# Patient Record
Sex: MALE | URBAN - METROPOLITAN AREA
[De-identification: names, ages, dates, MRNs, and addresses within clinical notes are randomized per-mention and may not be internally consistent; named-entity substitution may affect disease eponyms.]

---

## 2021-11-24 ENCOUNTER — EMERGENCY (EMERGENCY)
Facility: HOSPITAL | Age: 66
LOS: 0 days | Discharge: HOME | End: 2021-11-24
Attending: EMERGENCY MEDICINE
Payer: MEDICARE

## 2021-11-24 VITALS — WEIGHT: 179.9 LBS | HEIGHT: 71 IN

## 2021-11-24 VITALS
DIASTOLIC BLOOD PRESSURE: 93 MMHG | TEMPERATURE: 98 F | OXYGEN SATURATION: 98 % | RESPIRATION RATE: 16 BRPM | SYSTOLIC BLOOD PRESSURE: 153 MMHG | HEART RATE: 66 BPM

## 2021-11-24 DIAGNOSIS — R51.9 HEADACHE, UNSPECIFIED: ICD-10-CM

## 2021-11-24 DIAGNOSIS — M54.9 DORSALGIA, UNSPECIFIED: ICD-10-CM

## 2021-11-24 DIAGNOSIS — E78.5 HYPERLIPIDEMIA, UNSPECIFIED: ICD-10-CM

## 2021-11-24 DIAGNOSIS — R20.2 PARESTHESIA OF SKIN: ICD-10-CM

## 2021-11-24 DIAGNOSIS — M54.50 LOW BACK PAIN, UNSPECIFIED: ICD-10-CM

## 2021-11-24 DIAGNOSIS — I10 ESSENTIAL (PRIMARY) HYPERTENSION: ICD-10-CM

## 2021-11-24 PROCEDURE — 99284 EMERGENCY DEPT VISIT MOD MDM: CPT

## 2021-11-24 PROCEDURE — 70450 CT HEAD/BRAIN W/O DYE: CPT | Mod: MA

## 2021-11-24 PROCEDURE — 70450 CT HEAD/BRAIN W/O DYE: CPT | Mod: 26,MA

## 2021-11-24 PROCEDURE — 99284 EMERGENCY DEPT VISIT MOD MDM: CPT | Mod: 25

## 2021-11-24 PROCEDURE — 96374 THER/PROPH/DIAG INJ IV PUSH: CPT

## 2021-11-24 PROCEDURE — 96375 TX/PRO/DX INJ NEW DRUG ADDON: CPT

## 2021-11-24 RX ORDER — SODIUM CHLORIDE 9 MG/ML
1000 INJECTION INTRAMUSCULAR; INTRAVENOUS; SUBCUTANEOUS ONCE
Refills: 0 | Status: COMPLETED | OUTPATIENT
Start: 2021-11-24 | End: 2021-11-24

## 2021-11-24 RX ORDER — METOCLOPRAMIDE HCL 10 MG
10 TABLET ORAL ONCE
Refills: 0 | Status: COMPLETED | OUTPATIENT
Start: 2021-11-24 | End: 2021-11-24

## 2021-11-24 RX ORDER — DIPHENHYDRAMINE HCL 50 MG
25 CAPSULE ORAL ONCE
Refills: 0 | Status: COMPLETED | OUTPATIENT
Start: 2021-11-24 | End: 2021-11-24

## 2021-11-24 RX ADMIN — Medication 25 MILLIGRAM(S): at 10:58

## 2021-11-24 RX ADMIN — SODIUM CHLORIDE 1000 MILLILITER(S): 9 INJECTION INTRAMUSCULAR; INTRAVENOUS; SUBCUTANEOUS at 10:58

## 2021-11-24 RX ADMIN — Medication 10 MILLIGRAM(S): at 10:58

## 2021-11-24 NOTE — ED ADULT NURSE NOTE - OBJECTIVE STATEMENT
pt presents to ER for evaluation of constant right sided headache for past 3 days, has taken tylenol with no relief in symptoms. pt denies dizziness, weakness or visual disturbance

## 2021-11-24 NOTE — ED STATDOCS - PROGRESS NOTE DETAILS
67 yo male presents with 3 days of R sided HA, gradually came on. Never had headaches/migraines in the past. States associated tingling to the b/l fingers and lower back pain. Neuro exam unremarkable. Gait normal. Lower back exam/ Nontender to palpation. No midline ttp. No CVAT. Will check CT head, meds, and reeval. -Edmond Parry PA-C Pt feeling better. HA improved. CT head negative and discussed with pt and wife. Advised to continue tylenol and will give neuro f/u. Lower back pain likely msk in nature. Advised to alternate wth ice and heat and take the tylenol.  -Edmond Parry PA-C

## 2021-11-24 NOTE — ED STATDOCS - PATIENT PORTAL LINK FT
You can access the FollowMyHealth Patient Portal offered by [facility] by registering at the following website: [email or web address]. By joining [employer]’s FollowMyHealth portal, you will also be able to view your health information using other applications (apps) compatible with our system.

## 2021-11-24 NOTE — ED ADULT TRIAGE NOTE - NS ED NURSE DIRECT TO ROOM YN
Acute encephalopathy
Hypertension, unspecified type
Yes

## 2021-11-24 NOTE — ED ADULT TRIAGE NOTE - CHIEF COMPLAINT QUOTE
c/o constant right sided headache for past 3 days, has taken tylenol with no relief in symptoms HX: HTN, high cholesterol, denies dizziness or visual disturbance

## 2021-11-24 NOTE — ED STATDOCS - CARE PROVIDER_API CALL
María Elena Plata)  Neurology  [address] [facility], [address] [place] [address]  Phone: [phone]  Fax: [phone]  Follow Up Time:

## 2021-11-24 NOTE — ED STATDOCS - OBJECTIVE STATEMENT
67 y/o M with a PMHx of HTN, HLD presents to the ED c/o 8/10  R sided HA x 3 days. Denies having HAs prior. Pt has taken Tylenol  6 AM with no relief. Bright lights and loud noises does not worsen the HA. Pt also notes associated B/L hand tingling. No vision changes. No fever. No neck stiffness. Denies dizziness. Denies unsteady gait. Denies hematuria or dysuria. Denies bowel or urinary incontinence. Pt also c/o back pain; states he has difficulty sleeping at night.

## 2021-11-24 NOTE — ED STATDOCS - CLINICAL SUMMARY MEDICAL DECISION MAKING FREE TEXT BOX
Not sudden onset.  No f/c.  No meningeal signs.  No vision changes.  No focal deficits.  No red flags for dangerous etiology of HA. Not sudden onset.  No f/c.  No meningeal signs.  No vision changes.  No focal deficits.  No red flags for dangerous etiology of HA.  WIll provide meds and reassess.

## 2021-12-08 PROBLEM — Z00.00 ENCOUNTER FOR PREVENTIVE HEALTH EXAMINATION: Status: ACTIVE | Noted: 2021-12-08

## 2021-12-08 PROBLEM — Z78.9 OTHER SPECIFIED HEALTH STATUS: Chronic | Status: ACTIVE | Noted: 2021-11-28

## 2021-12-09 ENCOUNTER — APPOINTMENT (OUTPATIENT)
Dept: NEUROLOGY | Facility: CLINIC | Age: 66
End: 2021-12-09
Payer: MEDICARE

## 2021-12-09 ENCOUNTER — NON-APPOINTMENT (OUTPATIENT)
Age: 66
End: 2021-12-09

## 2021-12-09 VITALS
DIASTOLIC BLOOD PRESSURE: 85 MMHG | HEIGHT: 67 IN | HEART RATE: 70 BPM | BODY MASS INDEX: 28.25 KG/M2 | WEIGHT: 180 LBS | TEMPERATURE: 97.8 F | SYSTOLIC BLOOD PRESSURE: 121 MMHG

## 2021-12-09 DIAGNOSIS — M79.2 NEURALGIA AND NEURITIS, UNSPECIFIED: ICD-10-CM

## 2021-12-09 DIAGNOSIS — R20.0 ANESTHESIA OF SKIN: ICD-10-CM

## 2021-12-09 DIAGNOSIS — R20.2 ANESTHESIA OF SKIN: ICD-10-CM

## 2021-12-09 PROCEDURE — 99204 OFFICE O/P NEW MOD 45 MIN: CPT

## 2021-12-09 NOTE — PHYSICAL EXAM
[General Appearance - Alert] : alert [General Appearance - In No Acute Distress] : in no acute distress [Oriented To Time, Place, And Person] : oriented to person, place, and time [Impaired Insight] : insight and judgment were intact [Affect] : the affect was normal [Person] : oriented to person [Place] : oriented to place [Time] : oriented to time [Concentration Intact] : normal concentrating ability [Visual Intact] : visual attention was ~T not ~L decreased [Naming Objects] : no difficulty naming common objects [Repeating Phrases] : no difficulty repeating a phrase [Writing A Sentence] : no difficulty writing a sentence [Fluency] : fluency intact [Comprehension] : comprehension intact [Reading] : reading intact [Past History] : adequate knowledge of personal past history [Cranial Nerves Optic (II)] : visual acuity intact bilaterally,  visual fields full to confrontation, pupils equal round and reactive to light [Cranial Nerves Oculomotor (III)] : extraocular motion intact [Cranial Nerves Trigeminal (V)] : facial sensation intact symmetrically [Cranial Nerves Facial (VII)] : face symmetrical [Cranial Nerves Vestibulocochlear (VIII)] : hearing was intact bilaterally [Cranial Nerves Glossopharyngeal (IX)] : tongue and palate midline [Cranial Nerves Accessory (XI - Cranial And Spinal)] : head turning and shoulder shrug symmetric [Cranial Nerves Hypoglossal (XII)] : there was no tongue deviation with protrusion [Motor Tone] : muscle tone was normal in all four extremities [Motor Strength] : muscle strength was normal in all four extremities [No Muscle Atrophy] : normal bulk in all four extremities [Sensation Tactile Decrease] : light touch was intact [Abnormal Walk] : normal gait [Balance] : balance was intact [Past-pointing] : there was no past-pointing [Tremor] : no tremor present [2+] : Ankle jerk left 2+ [Plantar Reflex Right Only] : normal on the right [Plantar Reflex Left Only] : normal on the left [Neck Appearance] : the appearance of the neck was normal [] : the neck was supple [Neck Cervical Mass (___cm)] : no neck mass was observed [Arterial Pulses Carotid] : carotid pulses were normal with no bruits [Full Pulse] : the pedal pulses are present [No Visual Abnormalities] : no visible abnormalities [Normal Lordosis] : normal lordosis [No Scoliosis] : no scoliosis [No Tenderness to Palpation] : no spine tenderness on palpation [No Masses] : no masses [Full ROM] : full ROM [No Pain with ROM] : no pain with motion in any direction

## 2021-12-09 NOTE — ASSESSMENT
[FreeTextEntry1] : 66-year-old man complaining of recurrent intermittent uncomfortable pains that will come and go throughout his body, not for several months, no history of diabetes, no history of Lyme disease, examination was slightly reduced reflexes distally but otherwise unremarkable. Complaint of numbness and tingling of the hands, questionable carpal tunnel syndrome, r\par Neuropathy.\par plan: Electromyography and nerve conduction study of the hands\par Start duloxetine 30 mg once a day after meals,if tolerated in one week, increase to 60 mg p.o. q.d.\par Obtain metabolic panel, CBC, vitamin B12 and folate levels, MAURICIO, rheumatoid factor, ESR, Lyme's antibodies\par

## 2021-12-09 NOTE — HISTORY OF PRESENT ILLNESS
[FreeTextEntry1] : 65 y/o M with a PMHx of HTN, HLD presented to the ED 11/24/2021, c/o 8/10 R sided HA x 3 days. Denies having HAs prior. Pt has taken Tylenol 6 AM with no relief. Bright lights and loud noises did not worsen the HA. Pt\par also notes associated B/L hand tingling. No vision changes. No fever. No neck stiffness. Denies dizziness. Denies unsteady gait. Denies hematuria or dysuria. Denies bowel or urinary incontinence. Pt also c/o back pain; states he has difficulty sleeping at night.\par he also reports intermittent uncomfortable pains, or burning sensation in the face, either side, different parts of the body, Ines a day or so, and then go away.Tylenol helps. Denies any tingling or numbness of the feet, but has noted numbness and tingling of both hands, which can occur throughout the day. Patient work as an [employer], so works with his hands.\par

## 2021-12-09 NOTE — DATA REVIEWED
[FreeTextEntry1] :  EXAM:  CT BRAIN                      \par \par \par PROCEDURE DATE:  11/24/2021  \par \par \par \par INTERPRETATION:  CLINICAL INDICATIONS:  HA\par \par COMPARISON: None.\par \par TECHNIQUE: Noncontrast CT of the head. Multiplanar reformations are submitted.\par \par FINDINGS:\par There is no compelling evidence for an acute transcortical infarction. There is no evidence of mass, mass effect, midline shift or extra-axial fluid collection. The lateral ventricles and cortical sulci are age-appropriate in size and configuration. Mild inflammatory mucosal changes are seen throughout the various portions of the paranasal sinuses are partially visualized. Chronic left orbit medial wall fracture deformity. The orbits and mastoid air cells are otherwise unremarkable. The calvarium is intact. Consider MRI as clinically warranted.\par \par IMPRESSION: No evidence of an acute transcortical infarction or hemorrhage.\par

## 2022-02-07 ENCOUNTER — RX RENEWAL (OUTPATIENT)
Age: 67
End: 2022-02-07

## 2022-03-11 ENCOUNTER — EMERGENCY (EMERGENCY)
Facility: HOSPITAL | Age: 67
LOS: 0 days | Discharge: HOME | End: 2022-03-11
Attending: EMERGENCY MEDICINE
Payer: MEDICARE

## 2022-03-11 VITALS — HEIGHT: 71 IN | WEIGHT: 177.91 LBS

## 2022-03-11 VITALS
TEMPERATURE: 99 F | RESPIRATION RATE: 16 BRPM | DIASTOLIC BLOOD PRESSURE: 73 MMHG | OXYGEN SATURATION: 99 % | HEART RATE: 60 BPM | SYSTOLIC BLOOD PRESSURE: 130 MMHG

## 2022-03-11 DIAGNOSIS — R07.9 CHEST PAIN, UNSPECIFIED: ICD-10-CM

## 2022-03-11 DIAGNOSIS — R91.8 OTHER NONSPECIFIC ABNORMAL FINDING OF LUNG FIELD: ICD-10-CM
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
## 2022-03-11 DIAGNOSIS — I10 ESSENTIAL (PRIMARY) HYPERTENSION: ICD-10-CM

## 2022-03-11 DIAGNOSIS — M54.50 LOW BACK PAIN, UNSPECIFIED: ICD-10-CM

## 2022-03-11 DIAGNOSIS — N28.9 DISORDER OF KIDNEY AND URETER, UNSPECIFIED: ICD-10-CM
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
## 2022-03-11 DIAGNOSIS — E78.5 HYPERLIPIDEMIA, UNSPECIFIED: ICD-10-CM

## 2022-03-11 DIAGNOSIS — R06.02 SHORTNESS OF BREATH: ICD-10-CM
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
## 2022-03-11 LAB
ALBUMIN SERPL ELPH-MCNC: 3.7 G/DL — SIGNIFICANT CHANGE UP (ref 3.3–5)
ALP SERPL-CCNC: 114 U/L — SIGNIFICANT CHANGE UP (ref 40–120)
ALT FLD-CCNC: 36 U/L — SIGNIFICANT CHANGE UP (ref 12–78)
ANION GAP SERPL CALC-SCNC: 2 MMOL/L — LOW (ref 5–17)
APPEARANCE UR: CLEAR — SIGNIFICANT CHANGE UP
AST SERPL-CCNC: 18 U/L — SIGNIFICANT CHANGE UP (ref 15–37)
BASOPHILS # BLD AUTO: 0.04 K/UL — SIGNIFICANT CHANGE UP (ref 0–0.2)
BASOPHILS NFR BLD AUTO: 0.7 % — SIGNIFICANT CHANGE UP (ref 0–2)
BILIRUB SERPL-MCNC: 1 MG/DL — SIGNIFICANT CHANGE UP (ref 0.2–1.2)
BILIRUB UR-MCNC: NEGATIVE — SIGNIFICANT CHANGE UP
BUN SERPL-MCNC: 26 MG/DL — HIGH (ref 7–23)
CALCIUM SERPL-MCNC: 8.9 MG/DL — SIGNIFICANT CHANGE UP (ref 8.5–10.1)
CHLORIDE SERPL-SCNC: 110 MMOL/L — HIGH (ref 96–108)
CO2 SERPL-SCNC: 27 MMOL/L — SIGNIFICANT CHANGE UP (ref 22–31)
COLOR SPEC: SIGNIFICANT CHANGE UP
CREAT SERPL-MCNC: 1.79 MG/DL — HIGH (ref 0.5–1.3)
D DIMER BLD IA.RAPID-MCNC: 163 NG/ML DDU — SIGNIFICANT CHANGE UP
DIFF PNL FLD: ABNORMAL
EGFR: 41 ML/MIN/1.73M2 — LOW
EOSINOPHIL # BLD AUTO: 0.13 K/UL — SIGNIFICANT CHANGE UP (ref 0–0.5)
EOSINOPHIL NFR BLD AUTO: 2.2 % — SIGNIFICANT CHANGE UP (ref 0–6)
GLUCOSE SERPL-MCNC: 75 MG/DL — SIGNIFICANT CHANGE UP (ref 70–99)
GLUCOSE UR QL: NEGATIVE — SIGNIFICANT CHANGE UP
HCT VFR BLD CALC: 45.3 % — SIGNIFICANT CHANGE UP (ref 39–50)
HGB BLD-MCNC: 15.1 G/DL — SIGNIFICANT CHANGE UP (ref 13–17)
IMM GRANULOCYTES NFR BLD AUTO: 0.2 % — SIGNIFICANT CHANGE UP (ref 0–1.5)
KETONES UR-MCNC: NEGATIVE — SIGNIFICANT CHANGE UP
LEUKOCYTE ESTERASE UR-ACNC: NEGATIVE — SIGNIFICANT CHANGE UP
LYMPHOCYTES # BLD AUTO: 2.18 K/UL — SIGNIFICANT CHANGE UP (ref 1–3.3)
LYMPHOCYTES # BLD AUTO: 37.1 % — SIGNIFICANT CHANGE UP (ref 13–44)
MCHC RBC-ENTMCNC: 31.5 PG — SIGNIFICANT CHANGE UP (ref 27–34)
MCHC RBC-ENTMCNC: 33.3 GM/DL — SIGNIFICANT CHANGE UP (ref 32–36)
MCV RBC AUTO: 94.6 FL — SIGNIFICANT CHANGE UP (ref 80–100)
MONOCYTES # BLD AUTO: 0.53 K/UL — SIGNIFICANT CHANGE UP (ref 0–0.9)
MONOCYTES NFR BLD AUTO: 9 % — SIGNIFICANT CHANGE UP (ref 2–14)
NEUTROPHILS # BLD AUTO: 2.98 K/UL — SIGNIFICANT CHANGE UP (ref 1.8–7.4)
NEUTROPHILS NFR BLD AUTO: 50.8 % — SIGNIFICANT CHANGE UP (ref 43–77)
NITRITE UR-MCNC: NEGATIVE — SIGNIFICANT CHANGE UP
NT-PROBNP SERPL-SCNC: 45 PG/ML — SIGNIFICANT CHANGE UP (ref 0–125)
PH UR: 6 — SIGNIFICANT CHANGE UP (ref 5–8)
PLATELET # BLD AUTO: 208 K/UL — SIGNIFICANT CHANGE UP (ref 150–400)
POTASSIUM SERPL-MCNC: 4.3 MMOL/L — SIGNIFICANT CHANGE UP (ref 3.5–5.3)
POTASSIUM SERPL-SCNC: 4.3 MMOL/L — SIGNIFICANT CHANGE UP (ref 3.5–5.3)
PROT SERPL-MCNC: 6.8 GM/DL — SIGNIFICANT CHANGE UP (ref 6–8.3)
PROT UR-MCNC: NEGATIVE — SIGNIFICANT CHANGE UP
RBC # BLD: 4.79 M/UL — SIGNIFICANT CHANGE UP (ref 4.2–5.8)
RBC # FLD: 12.5 % — SIGNIFICANT CHANGE UP (ref 10.3–14.5)
SODIUM SERPL-SCNC: 139 MMOL/L — SIGNIFICANT CHANGE UP (ref 135–145)
SP GR SPEC: 1 — LOW (ref 1.01–1.02)
TROPONIN I, HIGH SENSITIVITY RESULT: 7.59 NG/L — SIGNIFICANT CHANGE UP
UROBILINOGEN FLD QL: NEGATIVE — SIGNIFICANT CHANGE UP
WBC # BLD: 5.87 K/UL — SIGNIFICANT CHANGE UP (ref 3.8–10.5)
WBC # FLD AUTO: 5.87 K/UL — SIGNIFICANT CHANGE UP (ref 3.8–10.5)

## 2022-03-11 PROCEDURE — 96374 THER/PROPH/DIAG INJ IV PUSH: CPT

## 2022-03-11 PROCEDURE — 71046 X-RAY EXAM CHEST 2 VIEWS: CPT | Mod: 26

## 2022-03-11 PROCEDURE — 80053 COMPREHEN METABOLIC PANEL: CPT

## 2022-03-11 PROCEDURE — 85025 COMPLETE CBC W/AUTO DIFF WBC: CPT

## 2022-03-11 PROCEDURE — 85379 FIBRIN DEGRADATION QUANT: CPT

## 2022-03-11 PROCEDURE — 36415 COLL VENOUS BLD VENIPUNCTURE: CPT

## 2022-03-11 PROCEDURE — 74176 CT ABD & PELVIS W/O CONTRAST: CPT | Mod: MA

## 2022-03-11 PROCEDURE — 99285 EMERGENCY DEPT VISIT HI MDM: CPT | Mod: 25

## 2022-03-11 PROCEDURE — 84484 ASSAY OF TROPONIN QUANT: CPT

## 2022-03-11 PROCEDURE — 81001 URINALYSIS AUTO W/SCOPE: CPT

## 2022-03-11 PROCEDURE — 93010 ELECTROCARDIOGRAM REPORT: CPT

## 2022-03-11 PROCEDURE — 83880 ASSAY OF NATRIURETIC PEPTIDE: CPT

## 2022-03-11 PROCEDURE — 99285 EMERGENCY DEPT VISIT HI MDM: CPT

## 2022-03-11 PROCEDURE — 74176 CT ABD & PELVIS W/O CONTRAST: CPT | Mod: 26,MA

## 2022-03-11 PROCEDURE — 93005 ELECTROCARDIOGRAM TRACING: CPT

## 2022-03-11 PROCEDURE — 71046 X-RAY EXAM CHEST 2 VIEWS: CPT

## 2022-03-11 RX ORDER — KETOROLAC TROMETHAMINE 30 MG/ML
30 SYRINGE (ML) INJECTION ONCE
Refills: 0 | Status: DISCONTINUED | OUTPATIENT
Start: 2022-03-11 | End: 2022-03-11

## 2022-03-11 RX ORDER — SODIUM CHLORIDE 9 MG/ML
1000 INJECTION INTRAMUSCULAR; INTRAVENOUS; SUBCUTANEOUS ONCE
Refills: 0 | Status: COMPLETED | OUTPATIENT
Start: 2022-03-11 | End: 2022-03-11

## 2022-03-11 RX ORDER — TRAMADOL HYDROCHLORIDE 50 MG/1
50 TABLET ORAL ONCE
Refills: 0 | Status: DISCONTINUED | OUTPATIENT
Start: 2022-03-11 | End: 2022-03-11

## 2022-03-11 RX ORDER — IBUPROFEN 200 MG
1 TABLET ORAL
Qty: 28 | Refills: 0
Start: 2022-03-11 | End: 2022-03-17

## 2022-03-11 RX ORDER — CLARITHROMYCIN 500 MG
1 TABLET ORAL
Qty: 1 | Refills: 0
Start: 2022-03-11 | End: 2022-03-15

## 2022-03-11 RX ORDER — CYCLOBENZAPRINE HYDROCHLORIDE 10 MG/1
1 TABLET, FILM COATED ORAL
Qty: 21 | Refills: 0
Start: 2022-03-11 | End: 2022-03-17

## 2022-03-11 RX ADMIN — Medication 30 MILLIGRAM(S): at 14:40

## 2022-03-11 RX ADMIN — SODIUM CHLORIDE 2000 MILLILITER(S): 9 INJECTION INTRAMUSCULAR; INTRAVENOUS; SUBCUTANEOUS at 14:36

## 2022-03-11 RX ADMIN — TRAMADOL HYDROCHLORIDE 50 MILLIGRAM(S): 50 TABLET ORAL at 14:36

## 2022-03-11 NOTE — ED STATDOCS - PATIENT PORTAL LINK FT
You can access the FollowMyHealth Patient Portal offered by [facility] by registering at the following website: [email or web address]. By joining [employer]s FollowMyHealth portal, you will also be able to view your health information using other applications (apps) compatible with our system.

## 2022-03-11 NOTE — ED STATDOCS - CARE PLAN
1 Principal Discharge DX:	Right-sided back pain  Secondary Diagnosis:	Opacities of both lungs present on chest x-ray  Secondary Diagnosis:	Acute renal insufficiency

## 2022-03-11 NOTE — ED STATDOCS - OBJECTIVE STATEMENT
67 yo male w/PMH of HTN and HLD presents to the ED for CP and SOB since this morning and right lower back pain x2 days. Pt does heavy lifting for work. States pain worse w/movement. Denies fever/chills, cough, palpitations, headache, abdominal pain or N/V/D. Pt brother passed last month and since then with episodes of SOB and CP per son. Pt has been following with a nephrologist for his kidneys. No hx kidney stones. Pt has never had a stress test. Mother hx CHF.

## 2022-03-11 NOTE — ED STATDOCS - ATTENDING CONTRIBUTION TO CARE
I, Juan Pate, performed the initial face to face bedside interview with this patient regarding history of present illness, review of symptoms and relevant past medical, social and family history.  I completed an independent physical examination.  I was the initial provider who evaluated this patient. I have signed out the follow up of any pending tests (i.e. labs, radiological studies) to the ACP.  I have communicated the patient’s plan of care and disposition with the ACP.  The history, relevant review of systems, past medical and surgical history, medical decision making, and physical examination was documented by the scribe in my presence and I attest to the accuracy of the documentation.

## 2022-03-11 NOTE — ED STATDOCS - MUSCULOSKELETAL, MLM
range of motion is not limited and there is no muscle tenderness. +SLR on the right. no edema no calf tenderness.

## 2022-03-11 NOTE — ED STATDOCS - NSFOLLOWUPINSTRUCTIONS_ED_ALL_ED_FT
Dolor de espalda willy en los adultos    Acute Back Pain, Adult      El dolor de espalda willy es repentino y por lo general no dura mucho tiempo. Se debe generalmente a faviola lesión de los músculos y tejidos de la espalda. La lesión puede ser el resultado de:  •Estiramiento en exceso o desgarro (esguince) de un músculo o ligamento. Los ligamentos son tejidos que conectan los huesos. Levantar algo de forma incorrecta puede producir un esguince de espalda.      •Desgaste (degeneración) de los discos vertebrales. Los discos vertebrales son tejidos circulares que proporcionan amortiguación entre los huesos de la columna vertebral (vértebras).      •Movimientos de giro, laney al practicar deportes o realizar trabajos de jardinería.      •Un golpe en la espalda.      •Artritis.      Es posible que le realicen un examen físico, análisis de laboratorio u otros estudios de diagnóstico por imágenes para encontrar la causa del dolor. El dolor de espalda willy generalmente desaparece con reposo y cuidados en la casa.      Siga estas instrucciones en farmer casa:    Control del dolor, la rigidez y la hinchazón     •El tratamiento puede incluir medicamentos para el dolor y la inflamación que se donita por boca o que se aplican sobre la piel, analgésicos recetados o relajantes musculares. Use los medicamentos de venta juarez y los recetados solamente laney se lo haya indicado el médico.    •El médico puede recomendarle que se aplique hielo dahiana las primeras 24 a 48 horas después del comienzo del dolor. Para hacer esto:  •Ponga el hielo en faviola bolsa plástica.      •Coloque faviola toalla entre la piel y la bolsa.      •Aplique el hielo dahiana 20 minutos, 2 a 3 veces por día.      •Si se lo indican, aplique calor en la stan afectada con la frecuencia que le haya indicado el médico. Use la anna de calor que el médico le recomiende, laney faviola compresa de calor húmedo o faviola almohadilla térmica.  •Coloque faviola toalla entre la piel y la anna de calor.      •Aplique calor dahiana 20 a 30 minutos.      •Retire la anna de calor si la piel se pone de color kowalski brillante. [place] es especialmente importante si no puede sentir dolor, calor o frío. Corre un mayor riesgo de sufrir quemaduras.          Actividad      • No permanezca en la cama. Hacer reposo en la cama por más de 1 a 2 días puede demorar farmer recuperación.    •Mantenga faviola buena postura al sentarse y pararse. No se incline hacia adelante al sentarse ni se encorve al pararse.  •Si trabaja en un escritorio, siéntese cerca de kristin para no tener que inclinarse. Mantenga el mentón hacia abajo. Mantenga el sarah hacia atrás y los codos flexionados en un ángulo de 90 grados (ángulo recto).      •Cuando conduzca, siéntese elevado y cerca del volante. Agregue un apoyo para la espalda (lumbar) al asiento del automóvil, si es necesario.        •Realice caminatas cortas en superficies eun tan pronto laney le sea posible. Trate de caminar un poco más de tiempo cada día.      • No se siente, conduzca o permanezca de pie en un mismo lugar dahiana más de 30 minutos seguidos. Pararse o sentarse dahiana largos períodos de tiempo puede sobrecargar la espalda.      • No conduzca ni use maquinaria pesada mientras enzo analgésicos recetados.    •Use técnicas apropiadas para levantar objetos. Cuando se inclina y levanta un objeto, utilice posiciones que no sobrecarguen tanto la espalda:  •Flexione las rodillas.      •Mantenga la carga cerca del cuerpo.      •No se tuerza.        •Vj actividad física habitualmente laney se lo haya indicado el médico. Hacer ejercicios ayuda a que la espalda sane más rápido y ayuda a evitar las lesiones de la espalda al mantener los músculos yovani y flexibles.      •Trabaje con un fisioterapeuta para crear un programa de ejercicios seguros, según lo recomiende el médico. Vj ejercicios laney se lo haya indicado el fisioterapeuta.      Estilo de jeffrey     •Mantenga un peso saludable. El sobrepeso sobrecarga la espalda y hace que resulte difícil tener faviola buena postura.      •Evite actividades o situaciones que lo cali sentirse ansioso o estresado. El estrés y la ansiedad aumentan la tensión muscular y pueden empeorar el dolor de espalda. Aprenda formas de manejar la ansiedad y el estrés, laney a través del ejercicio.      Instrucciones generales     •Duerma sobre un colchón firme en faviola posición cómoda. Intente acostarse de costado, con las rodillas ligeramente flexionadas. Si se recuesta sobre la espalda, coloque faviola almohada debajo de las rodillas.    •Siga el plan de tratamiento laney se lo haya indicado el médico. [place] puede incluir:  •Terapia cognitiva o conductual.      •Acupuntura o terapia de masajes.      •Yoga o meditación.          Comuníquese con un médico si:    •Siente un dolor que no se lolly con reposo o medicamentos.      •Siente mucho dolor que se extiende a las piernas o las nalgas.      •El dolor no mejora luego de 2 semanas.      •Siente dolor por la noche.      •Pierde peso sin proponérselo.      •Tiene fiebre o escalofríos.        Solicite ayuda de inmediato si:    •Tiene nuevos problemas para controlar la vejiga o los intestinos.      •Siente debilidad o adormecimiento inusuales en los brazos o en las piernas.      •Siente náuseas o vómitos.      •Siente dolor abdominal.      •Siente que va a desmayarse.        Resumen    •El dolor de espalda willy es repentino y por lo general no dura mucho tiempo.      •Use técnicas apropiadas para levantar objetos. Cuando se inclina y levanta un objeto, utilice posiciones que no sobrecarguen tanto la espalda.      •[place] los medicamentos de venta juarez o recetados y aplique calor o hielo solamente laney se lo haya indicado el médico.      Esta información no tiene laney fin reemplazar el consejo del médico. Asegúrese de hacerle al médico cualquier pregunta que tenga.      Document Revised: 10/07/2021 Document Reviewed: 10/07/2021    Elsevier Patient Education © 2022 Else[employer] Inc.

## 2022-03-11 NOTE — ED STATDOCS - CLINICAL SUMMARY MEDICAL DECISION MAKING FREE TEXT BOX
Pt with x2 complaints, SOB and CP which worked up in the past, also with right lower back pain worse w/movement. Will get imaging, check labs and reassess.

## 2022-07-05 ENCOUNTER — APPOINTMENT (OUTPATIENT)
Dept: NEUROLOGY | Facility: CLINIC | Age: 67
End: 2022-07-05

## 2022-07-05 PROCEDURE — 99213 OFFICE O/P EST LOW 20 MIN: CPT

## 2022-07-05 NOTE — REVIEW OF SYSTEMS
[Anxiety] : anxiety [Emotional Problems] : emotional problems [As Noted in HPI] : as noted in HPI [Abnormal Sensation] : an abnormal sensation [Negative] : Heme/Lymph

## 2022-07-05 NOTE — HISTORY OF PRESENT ILLNESS
[FreeTextEntry1] : 67-year-old man with a history of hypertension hyperlipidemia, last seen December 2021, here for follow-up visit.  Has been prescribed duloxetine, which he reports tolerated well, did not help him.  Some of the discomfort, burning sensations have improved.  Also his mood improved, per his wife, for the last month he been more aggressive, more anxious.\par Patient reports he ran out of duloxetine 1 month ago.\par Last December, he has been referred for EMG nerve conduction of the upper extremity, due to persistent numbness tingling of the hands, which is now gone away.  Denies any weakness, numbness or tingling of hands or feet.

## 2022-07-05 NOTE — PHYSICAL EXAM
[General Appearance - Alert] : alert [General Appearance - In No Acute Distress] : in no acute distress [Oriented To Time, Place, And Person] : oriented to person, place, and time [Impaired Insight] : insight and judgment were intact [Affect] : the affect was normal [Person] : oriented to person [Place] : oriented to place [Time] : oriented to time [Fluency] : fluency intact [Comprehension] : comprehension intact [Past History] : adequate knowledge of personal past history [Cranial Nerves Optic (II)] : visual acuity intact bilaterally,  visual fields full to confrontation, pupils equal round and reactive to light [Cranial Nerves Oculomotor (III)] : extraocular motion intact [Cranial Nerves Trigeminal (V)] : facial sensation intact symmetrically [Cranial Nerves Facial (VII)] : face symmetrical [Cranial Nerves Vestibulocochlear (VIII)] : hearing was intact bilaterally [Cranial Nerves Glossopharyngeal (IX)] : tongue and palate midline [Cranial Nerves Accessory (XI - Cranial And Spinal)] : head turning and shoulder shrug symmetric [Cranial Nerves Hypoglossal (XII)] : there was no tongue deviation with protrusion [Motor Strength] : muscle strength was normal in all four extremities [Motor Handedness Right-Handed] : the patient is right hand dominant [Paresis Pronator Drift Right-Sided] : no pronator drift on the right [Paresis Pronator Drift Left-Sided] : no pronator drift on the left [Sensation Tactile Decrease] : light touch was intact [Abnormal Walk] : normal gait [Past-pointing] : there was no past-pointing [Tremor] : no tremor present [Dysdiadochokinesia Bilaterally] : not present [2+] : Ankle jerk left 2+

## 2022-07-05 NOTE — DISCUSSION/SUMMARY
[FreeTextEntry1] : 67-year-old man complaining of wondering paresthesias, intermittent headaches, did respond to duloxetine 60 mg, wife reports more anxiety, some aggressiveness.  Possibly symptoms due to underlying anxiety disorder.\par Plan: Discussed options, will restart duloxetine 60 mg p.o. daily after dinnertime.\par Return to office, 8 to 10 weeks

## 2022-09-13 ENCOUNTER — APPOINTMENT (OUTPATIENT)
Dept: NEUROLOGY | Facility: CLINIC | Age: 67
End: 2022-09-13

## 2022-09-13 ENCOUNTER — NON-APPOINTMENT (OUTPATIENT)
Age: 67
End: 2022-09-13

## 2022-09-13 VITALS
BODY MASS INDEX: 28.25 KG/M2 | DIASTOLIC BLOOD PRESSURE: 88 MMHG | HEART RATE: 66 BPM | SYSTOLIC BLOOD PRESSURE: 142 MMHG | TEMPERATURE: 97.8 F | WEIGHT: 180 LBS | HEIGHT: 67 IN

## 2022-09-13 DIAGNOSIS — F41.9 ANXIETY DISORDER, UNSPECIFIED: ICD-10-CM

## 2022-09-13 DIAGNOSIS — G25.0 ESSENTIAL TREMOR: ICD-10-CM

## 2022-09-13 PROCEDURE — 99214 OFFICE O/P EST MOD 30 MIN: CPT

## 2022-09-13 RX ORDER — DULOXETINE HYDROCHLORIDE 60 MG/1
60 CAPSULE, DELAYED RELEASE PELLETS ORAL
Qty: 30 | Refills: 2 | Status: DISCONTINUED | COMMUNITY
Start: 2021-12-09 | End: 2022-09-13

## 2022-09-13 NOTE — HISTORY OF PRESENT ILLNESS
[FreeTextEntry1] : 67-year-old man right-handed here for follow-up visit, patient was seen for complaints of numbness tingling, wandering throughout his body, more anxious, especially after losing his job.  Not working, wife reports he gets very anxious very agitated, patient when driving he gets very restless, which has been unchanged since starting duloxetine 60 mg once a day.\par Denies any subsequent numbness tingling of the months.\par She reports sleeping better, but otherwise through the day he can become quite distracting.\par He is also noticed some tremors of the hands, especially when he is holding onto something, does not interfere with writing, but it is bothersome to him.  No family history of essential tremor or Parkinson's disease.  Denies any weakness of the hands, no change in gait or balance.  No incontinence, no constipation.  He drinks a cup of coffee a day, does not really drink alcohol.\par

## 2022-09-13 NOTE — PHYSICAL EXAM
[General Appearance - Alert] : alert [General Appearance - In No Acute Distress] : in no acute distress [Oriented To Time, Place, And Person] : oriented to person, place, and time [Impaired Insight] : insight and judgment were intact [Affect] : the affect was normal [Person] : oriented to person [Place] : oriented to place [Time] : oriented to time [Cranial Nerves Optic (II)] : visual acuity intact bilaterally,  visual fields full to confrontation, pupils equal round and reactive to light [Cranial Nerves Oculomotor (III)] : extraocular motion intact [Cranial Nerves Trigeminal (V)] : facial sensation intact symmetrically [Cranial Nerves Facial (VII)] : face symmetrical [Cranial Nerves Vestibulocochlear (VIII)] : hearing was intact bilaterally [Cranial Nerves Glossopharyngeal (IX)] : tongue and palate midline [Cranial Nerves Accessory (XI - Cranial And Spinal)] : head turning and shoulder shrug symmetric [Cranial Nerves Hypoglossal (XII)] : there was no tongue deviation with protrusion [Motor Strength] : muscle strength was normal in all four extremities [Motor Handedness Right-Handed] : the patient is right hand dominant [Paresis Pronator Drift Right-Sided] : no pronator drift on the right [Paresis Pronator Drift Left-Sided] : no pronator drift on the left [Sensation Tactile Decrease] : light touch was intact [Abnormal Walk] : normal gait [Past-pointing] : there was no past-pointing [Tremor] : a tremor present [Dysdiadochokinesia Bilaterally] : not present [Coordination - Dysmetria Impaired Finger-to-Nose Bilateral] : not present

## 2022-09-13 NOTE — DISCUSSION/SUMMARY
[FreeTextEntry1] : 67-year-old man with anxiety disorder, placed on duloxetine 60 mg, without much change in his underlying level of stress, has noticed some tremors of the hands, patient action or kinetic base tremors.  Possible essential tremor, versus related to duloxetine.\par Plan: \par 1. taper off duloxetine 60 mg, continue once a day for 1 week, then start every other day, until D/C.\par 2. citalopram 10 mg once a day for anxiety.\par 3. trial with Inderal LA 80 mg p.o. daily\par Return to office, 2 to 3 months.
General

## 2022-09-13 NOTE — CONSULT LETTER
[Dear  ___] : Dear  [unfilled], [Courtesy Letter:] : I had the pleasure of seeing your patient, [unfilled], in my office today. [Please see my note below.] : Please see my note below. [Sincerely,] : Sincerely, [FreeTextEntry2] : Nighat Velasco MD [FreeTextEntry3] : Estiven Garcia

## 2022-12-20 ENCOUNTER — APPOINTMENT (OUTPATIENT)
Dept: NEUROLOGY | Facility: CLINIC | Age: 67
End: 2022-12-20

## 2022-12-20 VITALS
BODY MASS INDEX: 28.25 KG/M2 | WEIGHT: 180 LBS | HEIGHT: 67 IN | TEMPERATURE: 97.8 F | HEART RATE: 59 BPM | DIASTOLIC BLOOD PRESSURE: 71 MMHG | SYSTOLIC BLOOD PRESSURE: 107 MMHG

## 2022-12-20 PROCEDURE — 99213 OFFICE O/P EST LOW 20 MIN: CPT

## 2022-12-20 RX ORDER — LISINOPRIL 10 MG/1
10 TABLET ORAL
Refills: 0 | Status: DISCONTINUED | COMMUNITY
End: 2022-12-20

## 2022-12-20 RX ORDER — SIMVASTATIN 20 MG/1
20 TABLET, FILM COATED ORAL
Refills: 0 | Status: DISCONTINUED | COMMUNITY
End: 2022-12-20

## 2022-12-20 RX ORDER — TAMSULOSIN HYDROCHLORIDE 0.4 MG/1
0.4 CAPSULE ORAL
Qty: 30 | Refills: 0 | Status: ACTIVE | COMMUNITY
Start: 2022-11-11

## 2022-12-20 RX ORDER — LISINOPRIL 20 MG/1
20 TABLET ORAL
Refills: 0 | Status: ACTIVE | COMMUNITY

## 2022-12-20 RX ORDER — ATORVASTATIN CALCIUM 20 MG/1
20 TABLET, FILM COATED ORAL
Refills: 0 | Status: ACTIVE | COMMUNITY

## 2022-12-20 NOTE — HISTORY OF PRESENT ILLNESS
[FreeTextEntry1] : 67-year-old man with a history of upper extremity tremors, anxiety, depression, here for follow-up accompanied by his wife.  Continues on citalopram 10 mg once a day, for his anxiety and depression.  Which she reports is resolved.  Patient describes his symptoms to having lost his job, at the time he was treated also [employer] sold his house, mostly symptoms have resolved.  Now is working, feeling significantly better.\par Was placed also on propranolol ER 80 mg once a day for his tremors, which she reports really really helped, has a history of hypertension, lisinopril.  Would like to come off his medication also.\par

## 2022-12-20 NOTE — DISCUSSION/SUMMARY
[FreeTextEntry1] : 67-year-old man with a history of anxiety, likely reaction to losing his job, stress of selling home.  Now significantly better.  Wants to come off medication.\par On low-dose citalopram 10 mg daily.\par Advised to take every other day, for the next 2 weeks, and then discontinue.\par On propranolol for tremor, also anxiety, which significantly improved.\par Has a history of hypertension, followed by primary care, lisinopril, so far well controlled.\par Will decrease to's Inderal LA 60 mg once a day, for 1 month.  Eventually discontinue.\par Advised to follow-up with primary care for evaluation of blood pressure.\par Return to office, 2 months for reevaluation.

## 2022-12-20 NOTE — PHYSICAL EXAM
[General Appearance - Alert] : alert [General Appearance - In No Acute Distress] : in no acute distress [Oriented To Time, Place, And Person] : oriented to person, place, and time [Impaired Insight] : insight and judgment were intact [Affect] : the affect was normal [Person] : oriented to person [Place] : oriented to place [Time] : oriented to time [Cranial Nerves Optic (II)] : visual acuity intact bilaterally,  visual fields full to confrontation, pupils equal round and reactive to light [Cranial Nerves Oculomotor (III)] : extraocular motion intact [Cranial Nerves Trigeminal (V)] : facial sensation intact symmetrically [Cranial Nerves Facial (VII)] : face symmetrical [Cranial Nerves Vestibulocochlear (VIII)] : hearing was intact bilaterally [Cranial Nerves Glossopharyngeal (IX)] : tongue and palate midline [Cranial Nerves Accessory (XI - Cranial And Spinal)] : head turning and shoulder shrug symmetric [Cranial Nerves Hypoglossal (XII)] : there was no tongue deviation with protrusion [Motor Strength] : muscle strength was normal in all four extremities [Motor Handedness Right-Handed] : the patient is right hand dominant [Paresis Pronator Drift Right-Sided] : no pronator drift on the right [Paresis Pronator Drift Left-Sided] : no pronator drift on the left [Sensation Tactile Decrease] : light touch was intact [Abnormal Walk] : normal gait [Limited Balance] : balance was intact [Tremor] : no tremor present [Dysdiadochokinesia Bilaterally] : not present [Coordination - Dysmetria Impaired Finger-to-Nose Bilateral] : not present

## 2023-01-11 ENCOUNTER — RX RENEWAL (OUTPATIENT)
Age: 68
End: 2023-01-11

## 2023-02-08 ENCOUNTER — EMERGENCY (EMERGENCY)
Facility: HOSPITAL | Age: 68
LOS: 0 days | Discharge: HOME | End: 2023-02-08
Attending: EMERGENCY MEDICINE
Payer: MEDICARE

## 2023-02-08 VITALS
DIASTOLIC BLOOD PRESSURE: 75 MMHG | SYSTOLIC BLOOD PRESSURE: 125 MMHG | RESPIRATION RATE: 18 BRPM | HEART RATE: 60 BPM | TEMPERATURE: 99 F | OXYGEN SATURATION: 97 %

## 2023-02-08 VITALS — HEIGHT: 71 IN | WEIGHT: 179.9 LBS

## 2023-02-08 DIAGNOSIS — I10 ESSENTIAL (PRIMARY) HYPERTENSION: ICD-10-CM

## 2023-02-08 DIAGNOSIS — R60.0 LOCALIZED EDEMA: ICD-10-CM

## 2023-02-08 DIAGNOSIS — N28.9 DISORDER OF KIDNEY AND URETER, UNSPECIFIED: ICD-10-CM

## 2023-02-08 LAB
ALBUMIN SERPL ELPH-MCNC: 4.2 G/DL — SIGNIFICANT CHANGE UP (ref 3.3–5)
ALP SERPL-CCNC: 135 U/L — HIGH (ref 40–120)
ALT FLD-CCNC: 45 U/L — SIGNIFICANT CHANGE UP (ref 12–78)
ANION GAP SERPL CALC-SCNC: 5 MMOL/L — SIGNIFICANT CHANGE UP (ref 5–17)
APPEARANCE UR: CLEAR — SIGNIFICANT CHANGE UP
APTT BLD: 29.3 SEC — SIGNIFICANT CHANGE UP (ref 27.5–35.5)
AST SERPL-CCNC: 26 U/L — SIGNIFICANT CHANGE UP (ref 15–37)
BASOPHILS # BLD AUTO: 0.04 K/UL — SIGNIFICANT CHANGE UP (ref 0–0.2)
BASOPHILS NFR BLD AUTO: 0.6 % — SIGNIFICANT CHANGE UP (ref 0–2)
BILIRUB SERPL-MCNC: 1.2 MG/DL — SIGNIFICANT CHANGE UP (ref 0.2–1.2)
BILIRUB UR-MCNC: NEGATIVE — SIGNIFICANT CHANGE UP
BUN SERPL-MCNC: 38 MG/DL — HIGH (ref 7–23)
CALCIUM SERPL-MCNC: 8.7 MG/DL — SIGNIFICANT CHANGE UP (ref 8.5–10.1)
CHLORIDE SERPL-SCNC: 108 MMOL/L — SIGNIFICANT CHANGE UP (ref 96–108)
CO2 SERPL-SCNC: 25 MMOL/L — SIGNIFICANT CHANGE UP (ref 22–31)
COLOR SPEC: YELLOW — SIGNIFICANT CHANGE UP
CREAT SERPL-MCNC: 2.07 MG/DL — HIGH (ref 0.5–1.3)
DIFF PNL FLD: ABNORMAL
EGFR: 34 ML/MIN/1.73M2 — LOW
EOSINOPHIL # BLD AUTO: 0.23 K/UL — SIGNIFICANT CHANGE UP (ref 0–0.5)
EOSINOPHIL NFR BLD AUTO: 3.4 % — SIGNIFICANT CHANGE UP (ref 0–6)
GLUCOSE SERPL-MCNC: 88 MG/DL — SIGNIFICANT CHANGE UP (ref 70–99)
GLUCOSE UR QL: NEGATIVE — SIGNIFICANT CHANGE UP
HCT VFR BLD CALC: 45.2 % — SIGNIFICANT CHANGE UP (ref 39–50)
HGB BLD-MCNC: 15.2 G/DL — SIGNIFICANT CHANGE UP (ref 13–17)
IMM GRANULOCYTES NFR BLD AUTO: 0.3 % — SIGNIFICANT CHANGE UP (ref 0–0.9)
INR BLD: 1.11 RATIO — SIGNIFICANT CHANGE UP (ref 0.88–1.16)
KETONES UR-MCNC: NEGATIVE — SIGNIFICANT CHANGE UP
LEUKOCYTE ESTERASE UR-ACNC: NEGATIVE — SIGNIFICANT CHANGE UP
LYMPHOCYTES # BLD AUTO: 1.97 K/UL — SIGNIFICANT CHANGE UP (ref 1–3.3)
LYMPHOCYTES # BLD AUTO: 28.9 % — SIGNIFICANT CHANGE UP (ref 13–44)
MCHC RBC-ENTMCNC: 31.8 PG — SIGNIFICANT CHANGE UP (ref 27–34)
MCHC RBC-ENTMCNC: 33.6 GM/DL — SIGNIFICANT CHANGE UP (ref 32–36)
MCV RBC AUTO: 94.6 FL — SIGNIFICANT CHANGE UP (ref 80–100)
MONOCYTES # BLD AUTO: 0.72 K/UL — SIGNIFICANT CHANGE UP (ref 0–0.9)
MONOCYTES NFR BLD AUTO: 10.6 % — SIGNIFICANT CHANGE UP (ref 2–14)
NEUTROPHILS # BLD AUTO: 3.83 K/UL — SIGNIFICANT CHANGE UP (ref 1.8–7.4)
NEUTROPHILS NFR BLD AUTO: 56.2 % — SIGNIFICANT CHANGE UP (ref 43–77)
NITRITE UR-MCNC: NEGATIVE — SIGNIFICANT CHANGE UP
NT-PROBNP SERPL-SCNC: 100 PG/ML — SIGNIFICANT CHANGE UP (ref 0–125)
PH UR: 5 — SIGNIFICANT CHANGE UP (ref 5–8)
PLATELET # BLD AUTO: 214 K/UL — SIGNIFICANT CHANGE UP (ref 150–400)
POTASSIUM SERPL-MCNC: 4.2 MMOL/L — SIGNIFICANT CHANGE UP (ref 3.5–5.3)
POTASSIUM SERPL-SCNC: 4.2 MMOL/L — SIGNIFICANT CHANGE UP (ref 3.5–5.3)
PROT SERPL-MCNC: 7.2 GM/DL — SIGNIFICANT CHANGE UP (ref 6–8.3)
PROT UR-MCNC: 30 MG/DL
PROTHROM AB SERPL-ACNC: 12.9 SEC — SIGNIFICANT CHANGE UP (ref 10.5–13.4)
RBC # BLD: 4.78 M/UL — SIGNIFICANT CHANGE UP (ref 4.2–5.8)
RBC # FLD: 12.8 % — SIGNIFICANT CHANGE UP (ref 10.3–14.5)
SODIUM SERPL-SCNC: 138 MMOL/L — SIGNIFICANT CHANGE UP (ref 135–145)
SP GR SPEC: 1.02 — SIGNIFICANT CHANGE UP (ref 1.01–1.02)
TROPONIN I, HIGH SENSITIVITY RESULT: 5.64 NG/L — SIGNIFICANT CHANGE UP
UROBILINOGEN FLD QL: NEGATIVE — SIGNIFICANT CHANGE UP
WBC # BLD: 6.81 K/UL — SIGNIFICANT CHANGE UP (ref 3.8–10.5)
WBC # FLD AUTO: 6.81 K/UL — SIGNIFICANT CHANGE UP (ref 3.8–10.5)

## 2023-02-08 PROCEDURE — 85025 COMPLETE CBC W/AUTO DIFF WBC: CPT

## 2023-02-08 PROCEDURE — 81001 URINALYSIS AUTO W/SCOPE: CPT

## 2023-02-08 PROCEDURE — 93970 EXTREMITY STUDY: CPT | Mod: 26

## 2023-02-08 PROCEDURE — 99284 EMERGENCY DEPT VISIT MOD MDM: CPT | Mod: 25

## 2023-02-08 PROCEDURE — 85610 PROTHROMBIN TIME: CPT

## 2023-02-08 PROCEDURE — 93970 EXTREMITY STUDY: CPT

## 2023-02-08 PROCEDURE — 80053 COMPREHEN METABOLIC PANEL: CPT

## 2023-02-08 PROCEDURE — 84484 ASSAY OF TROPONIN QUANT: CPT

## 2023-02-08 PROCEDURE — 99284 EMERGENCY DEPT VISIT MOD MDM: CPT

## 2023-02-08 PROCEDURE — 36415 COLL VENOUS BLD VENIPUNCTURE: CPT

## 2023-02-08 PROCEDURE — 83880 ASSAY OF NATRIURETIC PEPTIDE: CPT

## 2023-02-08 PROCEDURE — 96374 THER/PROPH/DIAG INJ IV PUSH: CPT

## 2023-02-08 PROCEDURE — 85730 THROMBOPLASTIN TIME PARTIAL: CPT

## 2023-02-08 RX ORDER — FUROSEMIDE 40 MG
20 TABLET ORAL ONCE
Refills: 0 | Status: COMPLETED | OUTPATIENT
Start: 2023-02-08 | End: 2023-02-08

## 2023-02-08 RX ADMIN — Medication 20 MILLIGRAM(S): at 19:54

## 2023-02-08 NOTE — ED STATDOCS - NS ED ROS FT
Constitutional: No fevers, chills, or sweats.  Cardiac: No chest pain, exertional dyspnea, orthopnea  Respiratory: No shortness of breath, no cough  GI: No abdominal pain, no N/V/D  Neuro: No headaches, no neck pain/stiffness, no numbness  MSK: + Bilateral LE edema   All other systems reviewed and are negative unless otherwise stated in the HPI.

## 2023-02-08 NOTE — ED STATDOCS - PROGRESS NOTE DETAILS
68 yo male with a PMH of htn presents with b/l LE swelling since 1/28. Pt traveled to [facility] on 1/29 and came back saturday. Pt was seen by his PMD who sent him in to r/o DVT. Pt mentioned that his K and kidney function have been slightly increased as well. Will check labs, sono, cxr and reeval. -Edmond Parry PA-C Labs showed slightly increased Cr. Informed the pt and advised to f/u with pmd to recheck and monitor. Sono without DVT but shows edema. Will give 20mg lasix IV once and informed pt to use compression stockings and elevate the legs to help decrease the swelling. Also educated to stay away from salt and decrease salt intake to help with swelling. -Edmond Parry PA-C

## 2023-02-08 NOTE — ED STATDOCS - NS_ ATTENDINGSCRIBEDETAILS _ED_A_ED_FT
I, Wes Harper MD,  performed the initial face to face bedside interview with this patient regarding history of present illness, review of symptoms and relevant past medical, social and family history.  I completed an independent physical examination.  I was the initial provider who evaluated this patient. I have signed out the follow up of any pending tests (i.e. labs, radiological studies) to the PATITO.  I have communicated the patient’s plan of care and disposition with the PATITO.  The history, relevant review of systems, past medical and surgical history, medical decision making, and physical examination was documented by the scribe in my presence and I attest to the accuracy of the documentation.

## 2023-02-08 NOTE — ED STATDOCS - PATIENT PORTAL LINK FT
You can access the FollowMyHealth Patient Portal offered by [facility] by registering at the following website: [email or web address]. By joining [facility]’s FollowMyHealth portal, you will also be able to view your health information using other applications (apps) compatible with our system.

## 2023-02-08 NOTE — ED STATDOCS - NSFOLLOWUPINSTRUCTIONS_ED_ALL_ED_FT
Follow up with your primary care doctor to recheck your kidney function and for further treatment of you lower extremity edema.   Avoid salt intake.  Use compression stockings.   Elevate the legs.     Return to the ED for shortness of breath, chest pain, increased lower extremity swelling, or any new or other concerns.         Edema    Edema    A person's legs and feet. One leg is normal and the other leg is affected by edema.   El edema es faviola acumulación anormal de líquido en los tejidos del cuerpo y debajo de la piel. La hinchazón de las piernas, los pies y los tobillos es un síntoma frecuente que se hace más probable a medida que envejece. La hinchazón también es frecuente en los tejidos más sueltos, laney en la stan que rodea los ojos. La presión sobre la stan puede dejar faviola rigoberto temporal en la piel (edema con fóvea). Kristin líquido también puede acumularse en los pulmones (edema pulmonar).    Hay muchas causas posibles de edema. Consumir grandes cantidades de sal (sodio) y estar de pie o sentado dahiana mucho tiempo puede causar edema en las piernas, los pies y los tobillos. Entre las causas frecuentes de edema se incluyen las siguientes:  •Ciertas enfermedades, laney insuficiencia cardíaca, enfermedad renal o hepática, o cáncer.      •Debilidad de los vasos sanguíneos de las piernas.      •Faviola lesión.      •Embarazo.      •Medicamentos.      •Obesidad.      •Bajos niveles de proteínas en la tip.      Cuando hace calor, el edema puede empeorar. Generalmente, el edema es indoloro. La piel puede parecer hinchada o tener un aspecto brilloso.      Siga estas instrucciones en farmer casa:    Medicamentos     •Use los medicamentos de venta juarez y los recetados solamente laney se lo haya indicado el médico.      •El médico puede recetarle un medicamento para ayudar a que el cuerpo elimine el exceso de agua (diurético). Valentine kristin medicamento si se lo indican.      Comida y bebida     •Siga faviola dieta con poco contenido de sal (sodio) para reducir el líquido según las indicaciones del médico. A veces, disminuir el consumo de sal puede reducir la hinchazón.      •Según la causa de farmer hinchazón, es posible que deba limitar la cantidad de líquido que oli (restricción de líquido).      Instrucciones generales     •Cuando esté sentado o acostado, levante (eleve) la stan lesionada por encima del nivel del corazón.      • No permanezca quieto sentado o de pie dahiana largos períodos.      • No use ropa ajustada. No use ligas en la parte superior de las piernas.      •Ejercite las piernas para activar la circulación. [place] ayuda a que el líquido pase nuevamente a imelda vasos sanguíneos, y puede ayudar a reducir la hinchazón.      •Use medias de compresión laney se lo haya indicado farmer médico. Estas medias ayudan a evitar la formación de coágulos de tip y a reducir la hinchazón de las piernas. Es importante que helen del tamaño correcto. Estas medias deben ser prescritas por farmer médico para evitar posibles lesiones.      •Si le recomiendan vendajes, úselos laney se lo haya indicado el médico.        Comuníquese con un médico si:    •El edema no mejora con el tratamiento.      •Tiene enfermedades cardíacas, hepáticas o renales, y observa síntomas de edema.      •Aumenta de peso de manera repentina y sin motivo aparente.        Solicite ayuda de inmediato si:    •Siente falta de aire o dolor en el pecho.      •No puede respirar cuando se acuesta.      •Tiene dolor, enrojecimiento o calor en las zonas hinchadas.      •Tiene enfermedades cardíacas, hepáticas o renales y repentinamente tiene edema.      •Tiene fiebre y los síntomas empeoran repentinamente.      Estos síntomas pueden indicar faviola emergencia. Solicite ayuda de inmediato. Llame al 911.   • No espere a nedra si los síntomas desaparecen.       • No conduzca por imelda propios medios hasta el hospital.         Resumen    •El edema es faviola acumulación anormal de líquido en los tejidos del cuerpo y debajo de la piel.      •Consumir grandes cantidades de sal (sodio)y estar de pie o sentado dahiana mucho tiempo puede causar edema en las piernas, los pies y los tobillos.      •Cuando esté sentado o acostado, levante (eleve) la stan lesionada por encima del nivel del corazón.      •Siga las instrucciones del médico con respecto a la dieta y a la cantidad de líquido que puede beber.

## 2023-02-08 NOTE — ED ADULT TRIAGE NOTE - CHIEF COMPLAINT QUOTE
sent in from , as per MD, "please evaluate this patient for b/l lower legs swelling for possible acute DVT." pt denies SOB.

## 2023-02-08 NOTE — ED STATDOCS - CLINICAL SUMMARY MEDICAL DECISION MAKING FREE TEXT BOX
Pt with about 2 weeks of worsening bilateral LE edema. No erythema, pain, or fever. Low suspicion for cellulitis. Does have Hx of renal insufficiency with recent medication change, does have recent travel. Will need basic labs, urine, and venus duplex bilateral LE. No CP, SOB, or lightheadedness, hemodynamically stable, reassess.

## 2023-02-08 NOTE — ED STATDOCS - OBJECTIVE STATEMENT
66 y/o male with PMHx of renal insufficiency with recent medication change presents to the ED c/o bilateral LE edema since 1/28. Per wife at bedside, they departed to [facility] 1/29 and returned on Saturday. Notes prior Hx of kidney issues. Denies SOB, CP, fevers, or cough.

## 2023-02-08 NOTE — ED STATDOCS - ATTENDING APP SHARED VISIT CONTRIBUTION OF CARE
I, Wes Harper MD, personally saw the patient with PATITO.  I have personally performed a face to face diagnostic evaluation on this patient.  I have reviewed the PATITO note and agree with the history, exam, and plan of care, except as noted.

## 2023-02-08 NOTE — ED STATDOCS - NS ED ATTENDING STATEMENT MOD
This was a shared visit with the PATITO. I reviewed and verified the documentation and independently performed the documented:

## 2023-02-08 NOTE — ED STATDOCS - PHYSICAL EXAMINATION
General: AAOx3, NAD  HEENT: NCAT  Cardiac: Normal rate and rhythym, no murmurs, normal peripheral perfusion  Respiratory: Normal rate and effort. CTAB  GI: Soft, nondistended, nontender  Neuro: No focal deficits. ESQUIVEL equally x4, sensation to light touch intact throughout  MSK: FROMx4, no focal bony tenderness, 1+ pitting edema bilateral LE   Skin: No rash

## 2023-03-28 ENCOUNTER — APPOINTMENT (OUTPATIENT)
Dept: NEUROLOGY | Facility: CLINIC | Age: 68
End: 2023-03-28
Payer: MEDICARE

## 2023-03-28 VITALS
DIASTOLIC BLOOD PRESSURE: 74 MMHG | BODY MASS INDEX: 29.25 KG/M2 | SYSTOLIC BLOOD PRESSURE: 112 MMHG | TEMPERATURE: 97.8 F | HEART RATE: 63 BPM | WEIGHT: 182 LBS | HEIGHT: 66 IN

## 2023-03-28 PROCEDURE — 99213 OFFICE O/P EST LOW 20 MIN: CPT

## 2023-03-28 RX ORDER — PROPRANOLOL HYDROCHLORIDE 80 MG/1
80 CAPSULE, EXTENDED RELEASE ORAL
Qty: 30 | Refills: 0 | Status: DISCONTINUED | COMMUNITY
Start: 2023-01-11 | End: 2023-03-28

## 2023-03-28 RX ORDER — PROPRANOLOL HYDROCHLORIDE 60 MG/1
60 CAPSULE, EXTENDED RELEASE ORAL
Qty: 30 | Refills: 1 | Status: DISCONTINUED | COMMUNITY
Start: 2022-09-13 | End: 2023-03-28

## 2023-03-28 NOTE — PHYSICAL EXAM
[General Appearance - Alert] : alert [General Appearance - In No Acute Distress] : in no acute distress [Oriented To Time, Place, And Person] : oriented to person, place, and time [Impaired Insight] : insight and judgment were intact [Affect] : the affect was normal [Person] : oriented to person [Place] : oriented to place [Time] : oriented to time [Cranial Nerves Optic (II)] : visual acuity intact bilaterally,  visual fields full to confrontation, pupils equal round and reactive to light [Cranial Nerves Oculomotor (III)] : extraocular motion intact [Cranial Nerves Trigeminal (V)] : facial sensation intact symmetrically [Cranial Nerves Facial (VII)] : face symmetrical [Cranial Nerves Vestibulocochlear (VIII)] : hearing was intact bilaterally [Cranial Nerves Accessory (XI - Cranial And Spinal)] : head turning and shoulder shrug symmetric [Cranial Nerves Hypoglossal (XII)] : there was no tongue deviation with protrusion [Motor Strength] : muscle strength was normal in all four extremities [Motor Handedness Right-Handed] : the patient is right hand dominant [Paresis Pronator Drift Right-Sided] : no pronator drift on the right [Paresis Pronator Drift Left-Sided] : no pronator drift on the left [Sensation Tactile Decrease] : light touch was intact [Abnormal Walk] : normal gait [Limited Balance] : balance was intact [Tremor] : no tremor present [Dysdiadochokinesia Bilaterally] : not present [Coordination - Dysmetria Impaired Finger-to-Nose Bilateral] : not present

## 2023-03-28 NOTE — HISTORY OF PRESENT ILLNESS
[FreeTextEntry1] : 67-year-old man with history of anxiety disorder, intermittent tremors here for follow-up visit.  Reports doing very well, feels, relaxed.  Continues on citalopram 10 mg a day.  No reported side effects.\par Last visit was prescribed Inderal LA 60 mg daily, Pharmacy never refilled it, so has not taken it.  Patient does not believe it is any difference.

## 2023-03-28 NOTE — DISCUSSION/SUMMARY
[FreeTextEntry1] : 67-year-old man history of anxiety, doing well on low-dose citalopram 10 mg once a day.  Reviewed and discussed treatment options, no changes at this time.\par Return to office, 6 months.

## 2023-09-19 ENCOUNTER — APPOINTMENT (OUTPATIENT)
Dept: NEUROLOGY | Facility: CLINIC | Age: 68
End: 2023-09-19
Payer: MEDICARE

## 2023-09-19 VITALS
HEIGHT: 66 IN | DIASTOLIC BLOOD PRESSURE: 74 MMHG | BODY MASS INDEX: 29.25 KG/M2 | HEART RATE: 69 BPM | TEMPERATURE: 98.2 F | SYSTOLIC BLOOD PRESSURE: 119 MMHG | WEIGHT: 182 LBS

## 2023-09-19 PROCEDURE — 99214 OFFICE O/P EST MOD 30 MIN: CPT

## 2023-12-13 NOTE — ED ADULT TRIAGE NOTE - PAIN RATING/NUMBER SCALE (0-10): ACTIVITY
Patient is here with Iggy bains.        If any information or results need to be relayed from today's visit, the best way to contact the patient is via [phone] (mobile) - Patient gives verbal permission to leave a detailed voicemail at the number provided.       8

## 2023-12-28 ENCOUNTER — RX CHANGE (OUTPATIENT)
Age: 68
End: 2023-12-28

## 2023-12-28 RX ORDER — CITALOPRAM HYDROBROMIDE 10 MG/1
10 TABLET, FILM COATED ORAL
Qty: 30 | Refills: 5 | Status: DISCONTINUED | COMMUNITY
Start: 2022-09-13 | End: 2023-12-28

## 2023-12-28 RX ORDER — PROPRANOLOL HYDROCHLORIDE 20 MG/1
20 TABLET ORAL
Qty: 30 | Refills: 3 | Status: DISCONTINUED | COMMUNITY
Start: 2023-09-19 | End: 2023-12-28

## 2024-03-22 ENCOUNTER — APPOINTMENT (OUTPATIENT)
Dept: NEUROLOGY | Facility: CLINIC | Age: 69
End: 2024-03-22
Payer: MEDICARE

## 2024-03-22 VITALS
HEART RATE: 60 BPM | TEMPERATURE: 98.2 F | WEIGHT: 185 LBS | DIASTOLIC BLOOD PRESSURE: 72 MMHG | BODY MASS INDEX: 29.73 KG/M2 | SYSTOLIC BLOOD PRESSURE: 113 MMHG | HEIGHT: 66 IN

## 2024-03-22 PROCEDURE — 99214 OFFICE O/P EST MOD 30 MIN: CPT

## 2024-03-22 PROCEDURE — G2211 COMPLEX E/M VISIT ADD ON: CPT

## 2024-03-22 RX ORDER — CITALOPRAM HYDROBROMIDE 10 MG/1
10 TABLET, FILM COATED ORAL
Qty: 90 | Refills: 2 | Status: ACTIVE | COMMUNITY
Start: 1900-01-01 | End: 1900-01-01

## 2024-03-22 NOTE — HISTORY OF PRESENT ILLNESS
[FreeTextEntry1] : 68-year-old man right-handed history of mild tremor, anxiety, here accompanied by his wife.  Reports doing well, last month returned from a trip to [facility], had a good time. Denies any new medical issues or concerns. He reported his mood is stable, not anxious, tremors controlled. Continues on citalopram 10 mg once a day.  Also on propranolol 20 mg at night. Denies any dizziness lightheadedness, no chest pain, no palpitation, no shortness of breath.

## 2024-03-22 NOTE — DISCUSSION/SUMMARY
[FreeTextEntry1] : 68-year-old man right-handed history of anxiety disorder, stable on low-dose citalopram 10 mg once a day.  Reviewed and discussed treatment.  No change at this time. History of tremor, likely has physiologic tremor.  On very low-dose of propranolol. Reviewed options, will continue same dose of propranolol 20 mg at night. Return to office, 6 to 9 months.

## 2024-03-22 NOTE — PHYSICAL EXAM
[General Appearance - In No Acute Distress] : in no acute distress [General Appearance - Alert] : alert [Oriented To Time, Place, And Person] : oriented to person, place, and time [Affect] : the affect was normal [Impaired Insight] : insight and judgment were intact [Person] : oriented to person [Place] : oriented to place [Time] : oriented to time [Cranial Nerves Oculomotor (III)] : extraocular motion intact [Cranial Nerves Optic (II)] : visual acuity intact bilaterally,  visual fields full to confrontation, pupils equal round and reactive to light [Cranial Nerves Vestibulocochlear (VIII)] : hearing was intact bilaterally [Cranial Nerves Trigeminal (V)] : facial sensation intact symmetrically [Cranial Nerves Facial (VII)] : face symmetrical [Cranial Nerves Glossopharyngeal (IX)] : tongue and palate midline [Cranial Nerves Accessory (XI - Cranial And Spinal)] : head turning and shoulder shrug symmetric [Motor Strength] : muscle strength was normal in all four extremities [Cranial Nerves Hypoglossal (XII)] : there was no tongue deviation with protrusion [Motor Handedness Right-Handed] : the patient is right hand dominant [Paresis Pronator Drift Left-Sided] : no pronator drift on the left [Paresis Pronator Drift Right-Sided] : no pronator drift on the right [Sensation Tactile Decrease] : light touch was intact [Abnormal Walk] : normal gait [Limited Balance] : balance was intact [Tremor] : no tremor present [Dysdiadochokinesia Bilaterally] : not present [Coordination - Dysmetria Impaired Finger-to-Nose Bilateral] : not present [1+] : Patella left 1+

## 2024-04-23 RX ORDER — PROPRANOLOL HYDROCHLORIDE 20 MG/1
20 TABLET ORAL
Qty: 90 | Refills: 0 | Status: ACTIVE | COMMUNITY
Start: 1900-01-01 | End: 1900-01-01

## 2024-07-22 ENCOUNTER — RX RENEWAL (OUTPATIENT)
Age: 69
End: 2024-07-22

## 2024-09-06 ENCOUNTER — APPOINTMENT (OUTPATIENT)
Dept: UROLOGY | Facility: CLINIC | Age: 69
End: 2024-09-06
Payer: MEDICARE

## 2024-09-06 VITALS
BODY MASS INDEX: 28.25 KG/M2 | DIASTOLIC BLOOD PRESSURE: 74 MMHG | HEART RATE: 63 BPM | SYSTOLIC BLOOD PRESSURE: 113 MMHG | WEIGHT: 180 LBS | HEIGHT: 67 IN

## 2024-09-06 DIAGNOSIS — N40.1 BENIGN PROSTATIC HYPERPLASIA WITH LOWER URINARY TRACT SYMPMS: ICD-10-CM

## 2024-09-06 DIAGNOSIS — N13.8 BENIGN PROSTATIC HYPERPLASIA WITH LOWER URINARY TRACT SYMPMS: ICD-10-CM

## 2024-09-06 DIAGNOSIS — Z12.5 ENCOUNTER FOR SCREENING FOR MALIGNANT NEOPLASM OF PROSTATE: ICD-10-CM

## 2024-09-06 PROCEDURE — 99203 OFFICE O/P NEW LOW 30 MIN: CPT

## 2024-09-06 RX ORDER — TAMSULOSIN HYDROCHLORIDE 0.4 MG/1
0.4 CAPSULE ORAL
Qty: 90 | Refills: 3 | Status: ACTIVE | COMMUNITY
Start: 2024-09-06 | End: 1900-01-01

## 2024-09-06 NOTE — ASSESSMENT
[FreeTextEntry1] : 69 year old Male with exam and history consistent with BPH with LUTS.  The anatomy of the lower genitourinary tract was explained to the patient, with the urine passing through the bladder neck and prostatic urethra as it exits the body. Prostatic enlargement can constrict the lumen and the bladder outlet, causing incomplete bladder emptying and irritative symptoms such and frequency and urgency. Alpha blockers can be used to relax the bladder neck and facilitate passage of urine from the bladder. 5-alpha reductase inhibitors can be employed to shrink the prostate and thereby allow for passage of urine more easily. Anticholinergics to decrease bladder irritative symptoms were also discussed, but it was stressed that they can increase post void residual and risk urinary retention.   WIll Rx tamsulosin 0.4 mg daily  Foir prostate cancer screening, RUPESH is benign. Will sned for PSA

## 2024-09-06 NOTE — PHYSICAL EXAM
[Normal Appearance] : normal appearance [Well Groomed] : well groomed [General Appearance - In No Acute Distress] : no acute distress [Edema] : no peripheral edema [Respiration, Rhythm And Depth] : normal respiratory rhythm and effort [Exaggerated Use Of Accessory Muscles For Inspiration] : no accessory muscle use [Abdomen Soft] : soft [Abdomen Tenderness] : non-tender [Costovertebral Angle Tenderness] : no ~M costovertebral angle tenderness [Urethral Meatus] : meatus normal [Urinary Bladder Findings] : the bladder was normal on palpation [Scrotum] : the scrotum was normal [Testes Tenderness] : no tenderness of the testes [Testes Mass (___cm)] : there were no testicular masses [No Prostate Nodules] : no prostate nodules [Prostate Size ___ gm] : prostate size [unfilled] gm [Normal Station and Gait] : the gait and station were normal for the patient's age [] : no rash [No Focal Deficits] : no focal deficits [Oriented To Time, Place, And Person] : oriented to person, place, and time [Affect] : the affect was normal [Mood] : the mood was normal [No Palpable Adenopathy] : no palpable adenopathy

## 2024-09-06 NOTE — HISTORY OF PRESENT ILLNESS
[FreeTextEntry1] : 69 year old man seen 09/06/2024 with complaint of frequency, urgency, and double voiding. For BPH, he is s/p "laser surgery on prostate" about 8-10 years ago. not on any BPH medications.  It is moderate in severity. Nothing makes the symptoms better, nothing makes sx worse.  It is associated with nothing. No hematuria, no dysuria, no frequency, no urgency, no hesitancy, no straining. No incontinence.  No fevers, no chills, no nausea, no vomiting, no flank pain.

## 2024-09-09 LAB
APPEARANCE: ABNORMAL
BACTERIA UR CULT: NORMAL
BACTERIA: NEGATIVE /HPF
BILIRUBIN URINE: NEGATIVE
BLOOD URINE: NEGATIVE
CAST: 1 /LPF
COLOR: YELLOW
EPITHELIAL CELLS: 0 /HPF
GLUCOSE QUALITATIVE U: NEGATIVE MG/DL
KETONES URINE: NEGATIVE MG/DL
LEUKOCYTE ESTERASE URINE: ABNORMAL
MICROSCOPIC-UA: NORMAL
NITRITE URINE: NEGATIVE
PH URINE: 6
PROTEIN URINE: 30 MG/DL
RED BLOOD CELLS URINE: 1 /HPF
SPECIFIC GRAVITY URINE: 1.02
UROBILINOGEN URINE: 0.2 MG/DL
WHITE BLOOD CELLS URINE: 0 /HPF

## 2024-10-18 ENCOUNTER — APPOINTMENT (OUTPATIENT)
Dept: UROLOGY | Facility: CLINIC | Age: 69
End: 2024-10-18
Payer: MEDICARE

## 2024-10-18 ENCOUNTER — RX RENEWAL (OUTPATIENT)
Age: 69
End: 2024-10-18

## 2024-10-18 VITALS
WEIGHT: 180 LBS | HEIGHT: 67 IN | BODY MASS INDEX: 28.25 KG/M2 | SYSTOLIC BLOOD PRESSURE: 97 MMHG | DIASTOLIC BLOOD PRESSURE: 65 MMHG | HEART RATE: 69 BPM

## 2024-10-18 DIAGNOSIS — N13.8 BENIGN PROSTATIC HYPERPLASIA WITH LOWER URINARY TRACT SYMPMS: ICD-10-CM

## 2024-10-18 DIAGNOSIS — Z12.5 ENCOUNTER FOR SCREENING FOR MALIGNANT NEOPLASM OF PROSTATE: ICD-10-CM

## 2024-10-18 DIAGNOSIS — N40.1 BENIGN PROSTATIC HYPERPLASIA WITH LOWER URINARY TRACT SYMPMS: ICD-10-CM

## 2024-10-18 PROCEDURE — 99213 OFFICE O/P EST LOW 20 MIN: CPT

## 2024-12-03 ENCOUNTER — APPOINTMENT (OUTPATIENT)
Dept: NEUROLOGY | Facility: CLINIC | Age: 69
End: 2024-12-03
Payer: MEDICARE

## 2024-12-03 VITALS
SYSTOLIC BLOOD PRESSURE: 137 MMHG | HEART RATE: 60 BPM | WEIGHT: 180 LBS | DIASTOLIC BLOOD PRESSURE: 86 MMHG | TEMPERATURE: 98.2 F | HEIGHT: 67 IN | BODY MASS INDEX: 28.25 KG/M2

## 2024-12-03 DIAGNOSIS — R41.3 OTHER AMNESIA: ICD-10-CM

## 2024-12-03 PROCEDURE — 99213 OFFICE O/P EST LOW 20 MIN: CPT

## 2025-01-21 ENCOUNTER — RX RENEWAL (OUTPATIENT)
Age: 70
End: 2025-01-21

## 2025-03-28 ENCOUNTER — RX RENEWAL (OUTPATIENT)
Age: 70
End: 2025-03-28

## 2025-05-15 ENCOUNTER — RX RENEWAL (OUTPATIENT)
Age: 70
End: 2025-05-15

## 2025-06-10 ENCOUNTER — APPOINTMENT (OUTPATIENT)
Dept: NEUROLOGY | Facility: CLINIC | Age: 70
End: 2025-06-10
Payer: MEDICARE

## 2025-06-10 VITALS
SYSTOLIC BLOOD PRESSURE: 110 MMHG | WEIGHT: 182 LBS | HEIGHT: 67 IN | HEART RATE: 57 BPM | BODY MASS INDEX: 28.56 KG/M2 | DIASTOLIC BLOOD PRESSURE: 71 MMHG

## 2025-06-10 PROBLEM — F09 COGNITIVE DISORDER: Status: ACTIVE | Noted: 2025-06-10

## 2025-06-10 PROCEDURE — G2211 COMPLEX E/M VISIT ADD ON: CPT

## 2025-06-10 PROCEDURE — 99214 OFFICE O/P EST MOD 30 MIN: CPT

## 2025-06-10 RX ORDER — DONEPEZIL HYDROCHLORIDE 5 MG/1
5 TABLET ORAL
Qty: 30 | Refills: 2 | Status: ACTIVE | COMMUNITY
Start: 2025-06-10 | End: 1900-01-01

## 2025-08-11 ENCOUNTER — RX RENEWAL (OUTPATIENT)
Age: 70
End: 2025-08-11